# Patient Record
Sex: MALE | URBAN - METROPOLITAN AREA
[De-identification: names, ages, dates, MRNs, and addresses within clinical notes are randomized per-mention and may not be internally consistent; named-entity substitution may affect disease eponyms.]

---

## 2020-12-30 ENCOUNTER — APPOINTMENT (OUTPATIENT)
Dept: GENERAL RADIOLOGY | Age: 35
End: 2020-12-30
Attending: EMERGENCY MEDICINE
Payer: COMMERCIAL

## 2020-12-30 ENCOUNTER — HOSPITAL ENCOUNTER (EMERGENCY)
Age: 35
Discharge: HOME | End: 2020-12-31
Attending: STUDENT IN AN ORGANIZED HEALTH CARE EDUCATION/TRAINING PROGRAM
Payer: COMMERCIAL

## 2020-12-30 DIAGNOSIS — F48.9 MENTAL HEALTH PROBLEM: Primary | ICD-10-CM

## 2020-12-30 LAB
ALBUMIN SERPL-MCNC: 4 G/DL (ref 3.5–5)
ALBUMIN/GLOB SERPL: 1.1 {RATIO} (ref 1.1–2.2)
ALP SERPL-CCNC: 120 U/L (ref 45–117)
ALT SERPL-CCNC: 22 U/L (ref 12–78)
AMPHET UR QL SCN: NEGATIVE
ANION GAP SERPL CALC-SCNC: 4 MMOL/L (ref 5–15)
APAP SERPL-MCNC: <2 UG/ML (ref 10–30)
APPEARANCE UR: CLEAR
AST SERPL-CCNC: 13 U/L (ref 15–37)
BACTERIA URNS QL MICRO: NEGATIVE /HPF
BARBITURATES UR QL SCN: NEGATIVE
BASOPHILS # BLD: 0.1 K/UL (ref 0–0.1)
BASOPHILS NFR BLD: 1 % (ref 0–1)
BENZODIAZ UR QL: NEGATIVE
BILIRUB SERPL-MCNC: 0.5 MG/DL (ref 0.2–1)
BILIRUB UR QL: NEGATIVE
BUN SERPL-MCNC: 15 MG/DL (ref 6–20)
BUN/CREAT SERPL: 10 (ref 12–20)
CALCIUM SERPL-MCNC: 9.5 MG/DL (ref 8.5–10.1)
CANNABINOIDS UR QL SCN: NEGATIVE
CARBAMAZEPINE SERPL-MCNC: 16.3 UG/ML (ref 4–12)
CHLORIDE SERPL-SCNC: 106 MMOL/L (ref 97–108)
CO2 SERPL-SCNC: 30 MMOL/L (ref 21–32)
COCAINE UR QL SCN: NEGATIVE
COLOR UR: NORMAL
COMMENT, HOLDF: NORMAL
COVID-19 RAPID TEST, COVR: NOT DETECTED
CREAT SERPL-MCNC: 1.53 MG/DL (ref 0.7–1.3)
DATE LAST DOSE: NORMAL
DIFFERENTIAL METHOD BLD: ABNORMAL
DRUG SCRN COMMENT,DRGCM: NORMAL
EOSINOPHIL # BLD: 0.1 K/UL (ref 0–0.4)
EOSINOPHIL NFR BLD: 2 % (ref 0–7)
EPITH CASTS URNS QL MICRO: NORMAL /LPF
ERYTHROCYTE [DISTWIDTH] IN BLOOD BY AUTOMATED COUNT: 11.9 % (ref 11.5–14.5)
ETHANOL SERPL-MCNC: <10 MG/DL
GLOBULIN SER CALC-MCNC: 3.8 G/DL (ref 2–4)
GLUCOSE SERPL-MCNC: 107 MG/DL (ref 65–100)
GLUCOSE UR STRIP.AUTO-MCNC: NEGATIVE MG/DL
HCT VFR BLD AUTO: 37.1 % (ref 36.6–50.3)
HGB BLD-MCNC: 12.3 G/DL (ref 12.1–17)
HGB UR QL STRIP: NEGATIVE
HYALINE CASTS URNS QL MICRO: NORMAL /LPF (ref 0–5)
IMM GRANULOCYTES # BLD AUTO: 0 K/UL (ref 0–0.04)
IMM GRANULOCYTES NFR BLD AUTO: 0 % (ref 0–0.5)
KETONES UR QL STRIP.AUTO: NEGATIVE MG/DL
LEUKOCYTE ESTERASE UR QL STRIP.AUTO: NEGATIVE
LITHIUM SERPL-SCNC: 0.84 MMOL/L (ref 0.6–1.2)
LYMPHOCYTES # BLD: 0.4 K/UL (ref 0.8–3.5)
LYMPHOCYTES NFR BLD: 7 % (ref 12–49)
MCH RBC QN AUTO: 31.3 PG (ref 26–34)
MCHC RBC AUTO-ENTMCNC: 33.2 G/DL (ref 30–36.5)
MCV RBC AUTO: 94.4 FL (ref 80–99)
METHADONE UR QL: NEGATIVE
MONOCYTES # BLD: 0.4 K/UL (ref 0–1)
MONOCYTES NFR BLD: 8 % (ref 5–13)
NEUTS SEG # BLD: 4.3 K/UL (ref 1.8–8)
NEUTS SEG NFR BLD: 82 % (ref 32–75)
NITRITE UR QL STRIP.AUTO: NEGATIVE
NRBC # BLD: 0 K/UL (ref 0–0.01)
NRBC BLD-RTO: 0 PER 100 WBC
OPIATES UR QL: NEGATIVE
PCP UR QL: NEGATIVE
PH UR STRIP: 7 [PH] (ref 5–8)
PLATELET # BLD AUTO: 154 K/UL (ref 150–400)
PMV BLD AUTO: 10.1 FL (ref 8.9–12.9)
POTASSIUM SERPL-SCNC: 4.2 MMOL/L (ref 3.5–5.1)
PROT SERPL-MCNC: 7.8 G/DL (ref 6.4–8.2)
PROT UR STRIP-MCNC: NEGATIVE MG/DL
RBC # BLD AUTO: 3.93 M/UL (ref 4.1–5.7)
RBC #/AREA URNS HPF: NORMAL /HPF (ref 0–5)
RBC MORPH BLD: ABNORMAL
REPORTED DOSE,DOSE: NORMAL UNITS
REPORTED DOSE/TIME,TMG: NORMAL
SALICYLATES SERPL-MCNC: <1.7 MG/DL (ref 2.8–20)
SAMPLES BEING HELD,HOLD: NORMAL
SARS-COV-2, COV2: NOT DETECTED
SODIUM SERPL-SCNC: 140 MMOL/L (ref 136–145)
SOURCE, COVRS: NORMAL
SP GR UR REFRACTOMETRY: 1 (ref 1–1.03)
SPECIMEN SOURCE, FCOV2M: NORMAL
SPECIMEN SOURCE, FCOV2M: NORMAL
SPECIMEN TYPE, XMCV1T: NORMAL
UROBILINOGEN UR QL STRIP.AUTO: 0.2 EU/DL (ref 0.2–1)
WBC # BLD AUTO: 5.3 K/UL (ref 4.1–11.1)
WBC URNS QL MICRO: NORMAL /HPF (ref 0–4)

## 2020-12-30 PROCEDURE — 87635 SARS-COV-2 COVID-19 AMP PRB: CPT

## 2020-12-30 PROCEDURE — 99285 EMERGENCY DEPT VISIT HI MDM: CPT

## 2020-12-30 PROCEDURE — 71045 X-RAY EXAM CHEST 1 VIEW: CPT

## 2020-12-30 PROCEDURE — 90791 PSYCH DIAGNOSTIC EVALUATION: CPT

## 2020-12-30 PROCEDURE — 96372 THER/PROPH/DIAG INJ SC/IM: CPT

## 2020-12-30 PROCEDURE — 80156 ASSAY CARBAMAZEPINE TOTAL: CPT

## 2020-12-30 PROCEDURE — 80178 ASSAY OF LITHIUM: CPT

## 2020-12-30 PROCEDURE — 80307 DRUG TEST PRSMV CHEM ANLYZR: CPT

## 2020-12-30 PROCEDURE — 80053 COMPREHEN METABOLIC PANEL: CPT

## 2020-12-30 PROCEDURE — 85025 COMPLETE CBC W/AUTO DIFF WBC: CPT

## 2020-12-30 PROCEDURE — 81001 URINALYSIS AUTO W/SCOPE: CPT

## 2020-12-30 PROCEDURE — 36415 COLL VENOUS BLD VENIPUNCTURE: CPT

## 2020-12-30 NOTE — ED NOTES
Bedside and Verbal shift change report given to Jaquelin Guaman RN (oncoming nurse) by Vania Richmond RN (offgoing nurse). Report given with SBAR, Kardex, Intake/Output, MAR and Recent Results.
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
## 2020-12-30 NOTE — ED NOTES
RN spoke with [facility] and informed that patient will be discharged to [facility] in [place]. Per BSMART, patient will no longer need a sitter while in the department. Patient caregiver remains at bedside. MICHELLE Ruiz aware of plan.
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
## 2020-12-31 VITALS
TEMPERATURE: 97.9 F | OXYGEN SATURATION: 100 % | HEART RATE: 97 BPM | SYSTOLIC BLOOD PRESSURE: 114 MMHG | RESPIRATION RATE: 19 BRPM | DIASTOLIC BLOOD PRESSURE: 70 MMHG

## 2020-12-31 PROCEDURE — 74011000250 HC RX REV CODE- 250: Performed by: EMERGENCY MEDICINE

## 2020-12-31 PROCEDURE — 74011250636 HC RX REV CODE- 250/636: Performed by: EMERGENCY MEDICINE

## 2020-12-31 PROCEDURE — 74011250637 HC RX REV CODE- 250/637: Performed by: EMERGENCY MEDICINE

## 2020-12-31 RX ORDER — HYDROXYZINE 25 MG/1
50 TABLET, FILM COATED ORAL
Status: DISCONTINUED | OUTPATIENT
Start: 2020-12-31 | End: 2020-12-31 | Stop reason: HOSPADM

## 2020-12-31 RX ORDER — HYDROXYZINE 25 MG/1
50 TABLET, FILM COATED ORAL
Status: COMPLETED | OUTPATIENT
Start: 2020-12-31 | End: 2020-12-31

## 2020-12-31 RX ADMIN — HYDROXYZINE HYDROCHLORIDE 50 MG: 25 TABLET, FILM COATED ORAL at 03:11

## 2020-12-31 RX ADMIN — WATER 20 MG: 1 INJECTION INTRAMUSCULAR; INTRAVENOUS; SUBCUTANEOUS at 08:30
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
## 2020-12-31 NOTE — ED NOTES
Patient in bed eating with caregiver at bedside. Patient cooperative and appears to be in no distress at this time. RN will continue to [employer]

## 2020-12-31 NOTE — ED NOTES
This RN called and spoke with the Supervisor on call, Marnio Tierney at [facility]. The supervisor states that they have received all necessary paperwork, but are waiting for final clearance from their admission team.  She stated this would not happen until 0800 at the earliest.        RN updated patient's caregiver at bedside. Patient states he wants to go home. Caregiver stated she feels she is no longer able to take care of him d/t his aggressive outbursts. Caregiver then stated that she is not his legal guardian, but is employeed by his parents. RN advised caregiver to get in touch with the family to notify them of the situation. Per [facility] patient does not meet TDO criteria, per [employer] he does not meet ECO criteria. RN notified caregiver that we cannot keep him here if he wants to leave. Caregiver is currently trying to reach his family.

## 2020-12-31 NOTE — ED NOTES
Patient calm and cooperative. RN removed wrist restraints with patient understanding of safety. Patient given snacks and water. Sister at bedside.

## 2020-12-31 NOTE — ED NOTES
Patient sitting in bed quietly with caregiver at bedside. Patient appears to be in no acute distress at this time. RN will continue to monitor.

## 2020-12-31 NOTE — ED NOTES
Bedside shift change report given to Parminder Caraballo (oncoming nurse) by Arpit Mccloud (offgoing nurse). Report included the following information SBAR, ED Summary and Recent Results.

## 2020-12-31 NOTE — ED NOTES
Sister brought back to see the patient. She was updated with plan of care, precipitating events by both this RN and officer.

## 2020-12-31 NOTE — ED NOTES
This RN spoke with patient's sister's, Dionna Black and Nikkyvelia Abduljeff. They are aware patient is here and would like him to go to the [facility] house in the morning. They are concerned that if an ambulance transport is arranged, he may become anxious and scared and refuse to go. They would like to come pick him up and take him to the facility if he is accepted. RN asked if they are able to come sit with the patient now, they stated they are unable to be here at this time. Caregiver remains with patient. Pt calm and cooperative in the stretcher.     Pauline: [address]: [phone]
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
## 2020-12-31 NOTE — ED NOTES
Becky called to discuss the plan of care. Patient to be seen by Psych prior to arranging disposition.

## 2020-12-31 NOTE — ED NOTES
Patient becoming escalated. Caregiver requested PRN to help calm the patient. Provider alerted and order placed.

## 2020-12-31 NOTE — ED NOTES
Discharge discussed with the patient and sister, who verbalized understanding. Pt ambulatory to the exit with steady gait. VSS and GCS 15 at time of discharge. Patient calm, cooperative, and polite to staff upon exit.

## 2020-12-31 NOTE — ED NOTES
Officer at bedside to help control patient behaviors. Patient kicking at staff, yelling that he wanted to be destructive. Stating he was angry and enjoying destroying things.

## 2020-12-31 NOTE — ED NOTES
Patient calm and cooperative, sister at bedside. Ankle restraints removed, with understanding of calm behavior.

## 2020-12-31 NOTE — ED NOTES
Bedside and Verbal shift change report given to Columbus Harada, RN (oncoming nurse) by Roxy Pena RN (offgoing nurse). Report included the following information SBAR, ED Summary, MAR and Recent Results.

## 2020-12-31 NOTE — BSMART NOTE
Writer assisted attending RN with completing documentation requested by [facility] in attempts to have pt admitted to their crisis house. Supporting documentation was provided by the caregiver, corroborating her report that pt is prescribed Lithium 600mg nightly. As of 3am, [facility] has all requested documentation but has not yet accepted pt for admission. On-call staff is now reporting that pt's admission packet will be reviewed by staff at approximately [address] spoke at length with caregiver. Pt has already been deemed inappropriate for psychiatric hospitalization by the on-call provider (per prior [facility] clinician's note), thus [facility] has no basis to pursue voluntary or involuntary admission for pt. Writer advised caregiver that if [facility] continues to refuse to give a final disposition, or if [facility] ultimately declines pt for admission to their crisis house, the ED is unable to continue holding pt here indefinitely. Writer advised caregiver that if she is concerned pt is at risk of harm to self or others, she can petition the [employer] for an ECO to have pt prescreened by [facility].

## 2020-12-31 NOTE — ED NOTES
Patient becoming destructive towards property, ripping monitors off of the wall, throwing what he can grab. Caregiver exited the room.

## 2020-12-31 NOTE — ED NOTES
Spoke with [facility], patient accepted and ready to be received. Patient accompanied by his sister, who will leave the ED with the patient. All belongings taken with the patient. Sister assuming care at this time.

## 2020-12-31 NOTE — ED NOTES
Patient medical forms ([facility] documents) scanned into computer and emailed to [employer] at [facility].

## 2020-12-31 NOTE — ED NOTES
Patient resting quietly in bed with caregiver at bedside. Patient appears to be in no acute distress at this time. RN will continue to monitor.

## 2020-12-31 NOTE — ED NOTES
Patient sitting in bed listening to music with caregiver at bedside. Patient in no acute distress at this time.

## 2020-12-31 NOTE — BSMART NOTE
Writer aware of attending requesting [facility] review of pt today-writer spoke with attending nurse and was advised pt is in need of psychiatric consult and that a call to [facility] has been made to find out status of possible Crisis admission to their program. Cresencio Herrera shared that she was not sure of status but would call her supervisor and contact writer back of status. [facility]ao called writer back and shared that the [facility] nurse is still reviewing pt for possible admission to one of their crisis units. Pt placed under ECO.

## 2020-12-31 NOTE — ED NOTES
RN called and left voicemail on 24hr [facility] crisis line to get information on patient transportation to facility.